# Patient Record
Sex: FEMALE | Race: WHITE | NOT HISPANIC OR LATINO | Employment: OTHER | URBAN - METROPOLITAN AREA
[De-identification: names, ages, dates, MRNs, and addresses within clinical notes are randomized per-mention and may not be internally consistent; named-entity substitution may affect disease eponyms.]

---

## 2023-09-08 PROBLEM — H25.10 NUCLEAR SENILE CATARACT: Status: ACTIVE | Noted: 2023-09-08

## 2023-09-08 PROBLEM — E03.9 ACQUIRED HYPOTHYROIDISM: Status: ACTIVE | Noted: 2023-09-08

## 2023-09-08 PROBLEM — M81.0 AGE RELATED OSTEOPOROSIS: Status: ACTIVE | Noted: 2023-09-08

## 2023-09-08 PROBLEM — H35.09: Status: ACTIVE | Noted: 2023-09-08

## 2023-09-08 PROBLEM — H35.3190 NONEXUDATIVE AGE-RELATED MACULAR DEGENERATION: Status: ACTIVE | Noted: 2023-09-08

## 2023-09-08 PROBLEM — H35.349 DEGENERATION OF MACULA DUE TO CYST OR HOLE, UNSPECIFIED LATERALITY: Status: ACTIVE | Noted: 2023-09-08

## 2023-09-08 PROBLEM — H34.829: Status: ACTIVE | Noted: 2023-09-08

## 2023-09-08 PROBLEM — H35.89 MACULAR ATROPHY, RETINAL: Status: ACTIVE | Noted: 2023-09-08

## 2023-09-08 PROBLEM — I10 PRIMARY HYPERTENSION: Status: ACTIVE | Noted: 2023-09-08

## 2023-09-08 PROBLEM — R63.0 LOSS OF APPETITE: Status: ACTIVE | Noted: 2023-09-08

## 2023-09-08 RX ORDER — LISINOPRIL AND HYDROCHLOROTHIAZIDE 10; 12.5 MG/1; MG/1
1 TABLET ORAL DAILY
COMMUNITY
End: 2023-10-31 | Stop reason: SDUPTHER

## 2023-09-08 RX ORDER — MULTIVITAMIN
1 TABLET ORAL
COMMUNITY

## 2023-09-08 RX ORDER — LEVOTHYROXINE SODIUM 25 UG/1
1 TABLET ORAL DAILY
COMMUNITY
Start: 2017-04-13 | End: 2024-01-22 | Stop reason: SDUPTHER

## 2023-09-08 RX ORDER — ASPIRIN 81 MG/1
1 TABLET ORAL DAILY
COMMUNITY

## 2023-10-31 DIAGNOSIS — I10 PRIMARY HYPERTENSION: ICD-10-CM

## 2023-10-31 RX ORDER — LISINOPRIL AND HYDROCHLOROTHIAZIDE 10; 12.5 MG/1; MG/1
1 TABLET ORAL DAILY
Qty: 90 TABLET | Refills: 3 | Status: SHIPPED | OUTPATIENT
Start: 2023-10-31

## 2023-11-08 ENCOUNTER — OFFICE VISIT (OUTPATIENT)
Dept: PRIMARY CARE | Facility: CLINIC | Age: 88
End: 2023-11-08
Payer: MEDICARE

## 2023-11-08 VITALS
HEIGHT: 60 IN | RESPIRATION RATE: 16 BRPM | BODY MASS INDEX: 19.2 KG/M2 | DIASTOLIC BLOOD PRESSURE: 78 MMHG | HEART RATE: 60 BPM | WEIGHT: 97.8 LBS | OXYGEN SATURATION: 97 % | SYSTOLIC BLOOD PRESSURE: 130 MMHG

## 2023-11-08 DIAGNOSIS — Z23 ENCOUNTER FOR IMMUNIZATION: ICD-10-CM

## 2023-11-08 DIAGNOSIS — E03.9 ACQUIRED HYPOTHYROIDISM: ICD-10-CM

## 2023-11-08 DIAGNOSIS — R73.9 HYPERGLYCEMIA: ICD-10-CM

## 2023-11-08 DIAGNOSIS — H35.341 MACULAR HOLE OF RIGHT EYE: ICD-10-CM

## 2023-11-08 DIAGNOSIS — M81.0 AGE-RELATED OSTEOPOROSIS WITHOUT CURRENT PATHOLOGICAL FRACTURE: ICD-10-CM

## 2023-11-08 DIAGNOSIS — Z00.00 ROUTINE GENERAL MEDICAL EXAMINATION AT A HEALTH CARE FACILITY: Primary | ICD-10-CM

## 2023-11-08 DIAGNOSIS — I10 PRIMARY HYPERTENSION: ICD-10-CM

## 2023-11-08 DIAGNOSIS — Z13.220 LIPID SCREENING: ICD-10-CM

## 2023-11-08 PROCEDURE — G0439 PPPS, SUBSEQ VISIT: HCPCS | Performed by: INTERNAL MEDICINE

## 2023-11-08 PROCEDURE — 1159F MED LIST DOCD IN RCRD: CPT | Performed by: INTERNAL MEDICINE

## 2023-11-08 PROCEDURE — 3078F DIAST BP <80 MM HG: CPT | Performed by: INTERNAL MEDICINE

## 2023-11-08 PROCEDURE — 3075F SYST BP GE 130 - 139MM HG: CPT | Performed by: INTERNAL MEDICINE

## 2023-11-08 PROCEDURE — 1036F TOBACCO NON-USER: CPT | Performed by: INTERNAL MEDICINE

## 2023-11-08 PROCEDURE — 1126F AMNT PAIN NOTED NONE PRSNT: CPT | Performed by: INTERNAL MEDICINE

## 2023-11-08 PROCEDURE — G0008 ADMIN INFLUENZA VIRUS VAC: HCPCS | Performed by: INTERNAL MEDICINE

## 2023-11-08 ASSESSMENT — ENCOUNTER SYMPTOMS
HEMATURIA: 0
SLEEP DISTURBANCE: 0
VOMITING: 0
DIARRHEA: 0
SINUS PAIN: 0
DEPRESSION: 0
APPETITE CHANGE: 1
DIZZINESS: 0
OCCASIONAL FEELINGS OF UNSTEADINESS: 0
SHORTNESS OF BREATH: 0
WEAKNESS: 0
FEVER: 0
ABDOMINAL PAIN: 0
DIFFICULTY URINATING: 0
FATIGUE: 0
NAUSEA: 0
CHILLS: 0
ARTHRALGIAS: 0
PALPITATIONS: 0
CONSTIPATION: 0
FREQUENCY: 0
COUGH: 0
SORE THROAT: 0
LOSS OF SENSATION IN FEET: 0
JOINT SWELLING: 0
HEADACHES: 0
RHINORRHEA: 0
NERVOUS/ANXIOUS: 0

## 2023-11-08 ASSESSMENT — PAIN SCALES - GENERAL: PAINLEVEL: 0-NO PAIN

## 2023-11-08 ASSESSMENT — PATIENT HEALTH QUESTIONNAIRE - PHQ9
SUM OF ALL RESPONSES TO PHQ9 QUESTIONS 1 AND 2: 0
1. LITTLE INTEREST OR PLEASURE IN DOING THINGS: NOT AT ALL
2. FEELING DOWN, DEPRESSED OR HOPELESS: NOT AT ALL

## 2023-11-08 NOTE — PROGRESS NOTES
Subjective   Patient ID: Di Yo is a 89 y.o. female who presents for routine medical exam. Overall she is feeling well. Denies any pain or concerns. She has not had blood work in 2 years and requests labs. Interested in flu shot. Sees ophthalmologist. Patient will think about receiving bone density. Did not receive Shingrix and is not interested currently. She lives with her sister and no longer drives. Takes B12 and calcium supplements daily. Her appetite is limited but she reports it increases with activity.         Review of Systems   Constitutional:  Positive for appetite change (limited). Negative for chills, fatigue and fever.   HENT:  Negative for ear pain, rhinorrhea, sinus pain and sore throat.    Eyes:  Negative for visual disturbance.   Respiratory:  Negative for cough and shortness of breath.    Cardiovascular:  Negative for chest pain and palpitations.   Gastrointestinal:  Negative for abdominal pain, constipation, diarrhea, nausea and vomiting.   Genitourinary:  Negative for difficulty urinating, frequency and hematuria.   Musculoskeletal:  Negative for arthralgias and joint swelling.   Skin:  Negative for rash.   Neurological:  Negative for dizziness, weakness and headaches.   Psychiatric/Behavioral:  Negative for sleep disturbance. The patient is not nervous/anxious.        Objective   /78   Pulse 60   Resp 16   Ht 1.524 m (5')   Wt (!) 44.4 kg (97 lb 12.8 oz)   SpO2 97%   BMI 19.10 kg/m²     Physical Exam  HENT:      Right Ear: Tympanic membrane normal.      Left Ear: Tympanic membrane normal.      Mouth/Throat:      Pharynx: Oropharynx is clear. No oropharyngeal exudate.   Eyes:      Conjunctiva/sclera: Conjunctivae normal.      Pupils: Pupils are equal, round, and reactive to light.   Neck:      Thyroid: No thyromegaly.      Vascular: No carotid bruit.   Cardiovascular:      Rate and Rhythm: Regular rhythm.      Heart sounds: Normal heart sounds.   Pulmonary:      Breath sounds:  Normal breath sounds.   Chest:   Breasts:     Right: Normal. No mass or tenderness.      Left: Normal. No mass or tenderness.   Abdominal:      Palpations: Abdomen is soft. There is no hepatomegaly.      Tenderness: There is no abdominal tenderness.   Musculoskeletal:      Right lower leg: No edema.      Left lower leg: No edema.   Lymphadenopathy:      Cervical: No cervical adenopathy.   Skin:     General: Skin is warm.      Comments: No suspicious moles   Neurological:      Mental Status: She is alert and oriented to person, place, and time.      Motor: Motor function is intact.      Coordination: Coordination is intact.      Gait: Gait is intact.   Psychiatric:         Mood and Affect: Mood and affect normal.         Behavior: Behavior normal. Behavior is cooperative.         Cognition and Memory: Cognition normal.         Assessment/Plan   Diagnoses and all orders for this visit:  Routine general medical examination at a health care facility  Encounter for immunization  Hyperglycemia  Primary hypertension  Acquired hypothyroidism  Lipid screening  Macular hole of right eye  Age-related osteoporosis without current pathological fracture      Scribe Attestation  By signing my name below, IHarriett, Scribe   attest that this documentation has been prepared under the direction and in the presence of Qing Duran MD.

## 2024-02-13 ENCOUNTER — APPOINTMENT (OUTPATIENT)
Dept: OPHTHALMOLOGY | Facility: CLINIC | Age: 89
End: 2024-02-13
Payer: MEDICARE

## 2024-02-13 ENCOUNTER — CLINICAL SUPPORT (OUTPATIENT)
Dept: OPHTHALMOLOGY | Facility: CLINIC | Age: 89
End: 2024-02-13
Payer: MEDICARE

## 2024-02-13 ENCOUNTER — OFFICE VISIT (OUTPATIENT)
Dept: OPHTHALMOLOGY | Facility: CLINIC | Age: 89
End: 2024-02-13
Payer: MEDICARE

## 2024-02-13 DIAGNOSIS — H35.09: ICD-10-CM

## 2024-02-13 DIAGNOSIS — H52.7 REFRACTION ERROR: ICD-10-CM

## 2024-02-13 DIAGNOSIS — H25.813 COMBINED FORMS OF AGE-RELATED CATARACT OF BOTH EYES: ICD-10-CM

## 2024-02-13 DIAGNOSIS — H34.8322 STABLE BRANCH RETINAL VEIN OCCLUSION OF LEFT EYE (CMS-HCC): ICD-10-CM

## 2024-02-13 DIAGNOSIS — H35.341 MACULAR CYST, HOLE, OR PSEUDOHOLE, RIGHT EYE: Primary | ICD-10-CM

## 2024-02-13 PROBLEM — H35.349 DEGENERATION OF MACULA DUE TO CYST OR HOLE, UNSPECIFIED LATERALITY: Status: RESOLVED | Noted: 2023-09-08 | Resolved: 2024-02-13

## 2024-02-13 PROCEDURE — 99214 OFFICE O/P EST MOD 30 MIN: CPT | Performed by: OPHTHALMOLOGY

## 2024-02-13 PROCEDURE — 92134 CPTRZ OPH DX IMG PST SGM RTA: CPT | Mod: BILATERAL PROCEDURE | Performed by: OPHTHALMOLOGY

## 2024-02-13 PROCEDURE — 92015 DETERMINE REFRACTIVE STATE: CPT | Performed by: OPHTHALMOLOGY

## 2024-02-13 ASSESSMENT — KERATOMETRY
OD_AXISANGLE2_DEGREES: 85
OD_K1POWER_DIOPTERS: 44.00
OD_K2POWER_DIOPTERS: 45.25
OS_K1POWER_DIOPTERS: 44.50
OS_AXISANGLE2_DEGREES: 80
OS_AXISANGLE_DEGREES: 170
OD_AXISANGLE_DEGREES: 175
OS_K2POWER_DIOPTERS: 45.00
METHOD_AUTO_MANUAL: AUTOMATED

## 2024-02-13 ASSESSMENT — REFRACTION_WEARINGRX
OD_ADD: +3.00
OD_CYLINDER: -1.25
OD_AXIS: 064
OD_SPHERE: +3.00
SPECS_TYPE: BIFOCAL
OS_AXIS: 055
OS_ADD: +3.00
OS_CYLINDER: -1.25
OS_SPHERE: +3.00

## 2024-02-13 ASSESSMENT — VISUAL ACUITY
OS_CC: 20/30
METHOD: SNELLEN - SINGLE
OD_CC: HM
CORRECTION_TYPE: GLASSES
OS_CC+: -1

## 2024-02-13 ASSESSMENT — ENCOUNTER SYMPTOMS
GASTROINTESTINAL NEGATIVE: 0
HEMATOLOGIC/LYMPHATIC NEGATIVE: 0
DEPRESSION: 0
ENDOCRINE NEGATIVE: 0
ALLERGIC/IMMUNOLOGIC NEGATIVE: 0
EYES NEGATIVE: 0
RESPIRATORY NEGATIVE: 0
CARDIOVASCULAR NEGATIVE: 0
CONSTITUTIONAL NEGATIVE: 0
PSYCHIATRIC NEGATIVE: 0
NEUROLOGICAL NEGATIVE: 0
LOSS OF SENSATION IN FEET: 0
MUSCULOSKELETAL NEGATIVE: 0
OCCASIONAL FEELINGS OF UNSTEADINESS: 0

## 2024-02-13 ASSESSMENT — PAIN SCALES - GENERAL: PAINLEVEL: 0-NO PAIN

## 2024-02-13 ASSESSMENT — PATIENT HEALTH QUESTIONNAIRE - PHQ9
2. FEELING DOWN, DEPRESSED OR HOPELESS: NOT AT ALL
1. LITTLE INTEREST OR PLEASURE IN DOING THINGS: NOT AT ALL
SUM OF ALL RESPONSES TO PHQ9 QUESTIONS 1 AND 2: 0

## 2024-02-13 ASSESSMENT — REFRACTION_MANIFEST
OS_SPHERE: +3.25
OD_SPHERE: +1.25
OS_CYLINDER: -2.00
METHOD_AUTOREFRACTION: 1
OD_AXIS: 090
OD_CYLINDER: -2.75
OS_AXIS: 075

## 2024-02-13 ASSESSMENT — SLIT LAMP EXAM - LIDS
COMMENTS: 1+ BLEPHARITIS, 3+ DERMATOCHALASIS - UPPER LID
COMMENTS: 1+ BLEPHARITIS, 3+ DERMATOCHALASIS - UPPER LID

## 2024-02-13 ASSESSMENT — TONOMETRY
IOP_METHOD: GOLDMANN APPLANATION
OD_IOP_MMHG: 16
OS_IOP_MMHG: 15

## 2024-02-13 ASSESSMENT — EXTERNAL EXAM - RIGHT EYE: OD_EXAM: 2+ BROW PTOSIS

## 2024-02-13 ASSESSMENT — EXTERNAL EXAM - LEFT EYE: OS_EXAM: 2+ BROW PTOSIS

## 2024-02-13 ASSESSMENT — CUP TO DISC RATIO
OS_RATIO: 0.35
OD_RATIO: 0.35

## 2024-02-13 NOTE — PROGRESS NOTES
Subjective   Patient ID: Di Yo is a 89 y.o. female.    Chief Complaint    Annual Exam       HPI    No visual acuity (VA) complaints.    Complete, cataract, and macular exam.  No new changes in health history or meds.  Vision is essentially unchanged but wants new specs.  No new problems or complaints.      Last edited by Aydin Morales MD on 2/13/2024 11:06 AM.        No current outpatient medications on file. (Ophthalmology pharm classes)       Current Outpatient Medications (Other)   Medication Sig Dispense Refill    aspirin 81 mg EC tablet Take 1 tablet (81 mg) by mouth once daily.      calcium carbonate-vitamin D3 (Calcium 600 + D,3,) 600 mg-10 mcg (400 unit) tablet Take 1 tablet by mouth 2 times a day with meals.      levothyroxine (Synthroid, Levoxyl) 25 mcg tablet Take 1 tablet (25 mcg) by mouth once daily. 90 tablet 3    lisinopriL-hydrochlorothiazide 10-12.5 mg tablet Take 1 tablet by mouth once daily. 90 tablet 3       Objective   Base Eye Exam       Visual Acuity (Snellen - Single)         Right Left Both    Dist cc HM 20/30 -1     Near cc   J1      Correction: Glasses              Tonometry ([employer], 9:53 AM)         Right Left    Pressure 16 15              Pupils         Dark Shape React APD    Right 4 Round 2 None    Left 4 Round 2 None              Extraocular Movement         Right Left     Full Full              Dilation       Both eyes: 1% Tropic 2.5% Phen @ 9:53 AM                  Additional Tests       Keratometry (Automated)         K1 Axis K2 Axis    Right 44.00 85 45.25 175    Left 44.50 80 45.00 170                  Slit Lamp and Fundus Exam       External Exam         Right Left    External 2+ Brow ptosis 2+ Brow ptosis              Slit Lamp Exam         Right Left    Lids/Lashes 1+ Blepharitis, 3+ Dermatochalasis - upper lid 1+ Blepharitis, 3+ Dermatochalasis - upper lid    Conjunctiva/Sclera normal bulbar and palepbral conjunctiva normal bulbar and palepbral  conjunctiva    Cornea normal epi/stroma/endo and tear film normal epi/stroma/endo and tear film    Anterior Chamber ant. chamber deep and quiet ant. chamber deep and quiet    Iris iris normal iris normal    Lens 3+ Nuclear sclerosis, 2+ Cortical cataract 2+ Nuclear sclerosis, 2+ Cortical cataract    Anterior Vitreous vitreous syneresis vitreous syneresis              Fundus Exam         Right Left    Disc normal optic nerve tortuous vessels    C/D Ratio 0.35 0.35    Macula Macular hole Microaneurysms    Vessels normal vessels Tortuous    Periphery normal retinal periphery normal retinal periphery                  Refraction       Wearing Rx         Sphere Cylinder Axis Add    Right +3.00 -1.25 064 +3.00    Left +3.00 -1.25 055 +3.00      Type: Bifocal              Manifest Refraction (Auto)         Sphere Cylinder Axis    Right +1.25 -2.75 090    Left +3.25 -2.00 075              Final Rx         Sphere Cylinder [place] Dist VA Add Near VA    Right +3.00 -1.50 060 HM +3.00 HN    Left +3.00 -1.50 060 20/25 +3.00 20/25      Type: Bifocal    Expiration Date: 2/13/2026    Pupillary Distance: 62                    Assessment/Plan   Problem List Items Addressed This Visit          Eye/Vision problems    Intraretinal microvascular abnormality of left eye    Combined forms of age-related cataract of both eyes    Macular cyst, hole, or pseudohole, right eye - Primary    Relevant Orders    OCT, Retina - OU - Both Eyes (Completed)    Stable branch retinal vein occlusion of left eye    Refraction error

## 2024-02-26 DIAGNOSIS — E03.9 ACQUIRED HYPOTHYROIDISM: ICD-10-CM

## 2024-02-27 RX ORDER — LEVOTHYROXINE SODIUM 25 UG/1
25 TABLET ORAL DAILY
Qty: 90 TABLET | Refills: 3 | Status: SHIPPED | OUTPATIENT
Start: 2024-02-27 | End: 2025-02-26

## 2024-11-05 DIAGNOSIS — I10 PRIMARY HYPERTENSION: ICD-10-CM

## 2024-11-05 RX ORDER — LISINOPRIL AND HYDROCHLOROTHIAZIDE 10; 12.5 MG/1; MG/1
1 TABLET ORAL DAILY
Qty: 90 TABLET | Refills: 3 | Status: SHIPPED | OUTPATIENT
Start: 2024-11-05

## 2024-11-08 ENCOUNTER — APPOINTMENT (OUTPATIENT)
Dept: PRIMARY CARE | Facility: CLINIC | Age: 89
End: 2024-11-08
Payer: MEDICARE

## 2024-12-04 ENCOUNTER — OFFICE VISIT (OUTPATIENT)
Dept: PRIMARY CARE | Facility: CLINIC | Age: 89
End: 2024-12-04
Payer: MEDICARE

## 2024-12-04 VITALS
WEIGHT: 94.6 LBS | RESPIRATION RATE: 16 BRPM | OXYGEN SATURATION: 97 % | SYSTOLIC BLOOD PRESSURE: 140 MMHG | DIASTOLIC BLOOD PRESSURE: 80 MMHG | HEART RATE: 88 BPM | HEIGHT: 60 IN | BODY MASS INDEX: 18.57 KG/M2

## 2024-12-04 DIAGNOSIS — M81.0 AGE-RELATED OSTEOPOROSIS WITHOUT CURRENT PATHOLOGICAL FRACTURE: ICD-10-CM

## 2024-12-04 DIAGNOSIS — F41.9 ANXIETY: ICD-10-CM

## 2024-12-04 DIAGNOSIS — M19.011 BILATERAL SHOULDER REGION ARTHRITIS: ICD-10-CM

## 2024-12-04 DIAGNOSIS — I10 PRIMARY HYPERTENSION: ICD-10-CM

## 2024-12-04 DIAGNOSIS — M19.012 BILATERAL SHOULDER REGION ARTHRITIS: ICD-10-CM

## 2024-12-04 DIAGNOSIS — E03.9 ACQUIRED HYPOTHYROIDISM: ICD-10-CM

## 2024-12-04 DIAGNOSIS — Z00.00 ROUTINE GENERAL MEDICAL EXAMINATION AT A HEALTH CARE FACILITY: Primary | ICD-10-CM

## 2024-12-04 DIAGNOSIS — E53.8 B12 DEFICIENCY: ICD-10-CM

## 2024-12-04 PROCEDURE — 1170F FXNL STATUS ASSESSED: CPT | Performed by: INTERNAL MEDICINE

## 2024-12-04 PROCEDURE — 1159F MED LIST DOCD IN RCRD: CPT | Performed by: INTERNAL MEDICINE

## 2024-12-04 PROCEDURE — 99397 PER PM REEVAL EST PAT 65+ YR: CPT | Performed by: INTERNAL MEDICINE

## 2024-12-04 PROCEDURE — G0439 PPPS, SUBSEQ VISIT: HCPCS | Performed by: INTERNAL MEDICINE

## 2024-12-04 PROCEDURE — 1157F ADVNC CARE PLAN IN RCRD: CPT | Performed by: INTERNAL MEDICINE

## 2024-12-04 PROCEDURE — 99215 OFFICE O/P EST HI 40 MIN: CPT | Mod: 25 | Performed by: INTERNAL MEDICINE

## 2024-12-04 PROCEDURE — 99397 PER PM REEVAL EST PAT 65+ YR: CPT | Mod: 25 | Performed by: INTERNAL MEDICINE

## 2024-12-04 PROCEDURE — 3079F DIAST BP 80-89 MM HG: CPT | Performed by: INTERNAL MEDICINE

## 2024-12-04 PROCEDURE — 1125F AMNT PAIN NOTED PAIN PRSNT: CPT | Performed by: INTERNAL MEDICINE

## 2024-12-04 PROCEDURE — 3077F SYST BP >= 140 MM HG: CPT | Performed by: INTERNAL MEDICINE

## 2024-12-04 RX ORDER — LEVOTHYROXINE SODIUM 25 UG/1
25 TABLET ORAL DAILY
Qty: 90 TABLET | Refills: 3 | Status: SHIPPED | OUTPATIENT
Start: 2024-12-04 | End: 2025-12-04

## 2024-12-04 RX ORDER — LANOLIN ALCOHOL/MO/W.PET/CERES
1000 CREAM (GRAM) TOPICAL DAILY
COMMUNITY

## 2024-12-04 RX ORDER — LISINOPRIL AND HYDROCHLOROTHIAZIDE 10; 12.5 MG/1; MG/1
1 TABLET ORAL DAILY
Qty: 90 TABLET | Refills: 3 | Status: SHIPPED | OUTPATIENT
Start: 2024-12-04

## 2024-12-04 ASSESSMENT — ENCOUNTER SYMPTOMS
NERVOUS/ANXIOUS: 0
SHORTNESS OF BREATH: 0
SORE THROAT: 0
HEADACHES: 0
WEAKNESS: 0
LOSS OF SENSATION IN FEET: 0
DYSURIA: 0
APPETITE CHANGE: 1
SLEEP DISTURBANCE: 1
DIZZINESS: 0
OCCASIONAL FEELINGS OF UNSTEADINESS: 0
JOINT SWELLING: 0
DIARRHEA: 0
CHILLS: 0
CONSTIPATION: 0
FREQUENCY: 0
RHINORRHEA: 0
COUGH: 0
FATIGUE: 0
ARTHRALGIAS: 1
FEVER: 0
DIFFICULTY URINATING: 0
DEPRESSION: 0
NAUSEA: 0
PALPITATIONS: 0
HEMATURIA: 0
SINUS PAIN: 0
VOMITING: 0
ABDOMINAL PAIN: 0

## 2024-12-04 ASSESSMENT — COGNITIVE AND FUNCTIONAL STATUS - GENERAL
TRAIL MAKING TEST: PATIENT COMPLETES TRAIL MAKING TEST PROPERLY.
VERBAL FLUENCY - ANIMAL NAMES (0 TO 25): 25

## 2024-12-04 ASSESSMENT — ACTIVITIES OF DAILY LIVING (ADL)
GROCERY SHOPPING: NEEDS ASSISTANCE
STIL DRIVING: NO
USING TRANSPORTATION: NEEDS ASSISTANCE
DOING HOUSEWORK: NEEDS ASSISTANCE
PILL BOX USED: NO
PREPARING MEALS: INDEPENDENT
MANAGING FINANCES: NEEDS ASSISTANCE
USING TELEPHONE: INDEPENDENT
NEEDS ASSISTANCE WITH FOOD: INDEPENDENT
TAKING MEDICATION: INDEPENDENT
DRESSING: INDEPENDENT
FEEDING: INDEPENDENT
TOILETING: INDEPENDENT
ADEQUATE_TO_COMPLETE_ADL: YES
BATHING: INDEPENDENT
EATING: INDEPENDENT
JUDGMENT_ADEQUATE_SAFELY_COMPLETE_DAILY_ACTIVITIES: YES

## 2024-12-04 ASSESSMENT — PATIENT HEALTH QUESTIONNAIRE - PHQ9
1. LITTLE INTEREST OR PLEASURE IN DOING THINGS: NOT AT ALL
2. FEELING DOWN, DEPRESSED OR HOPELESS: NOT AT ALL
SUM OF ALL RESPONSES TO PHQ9 QUESTIONS 1 AND 2: 0

## 2024-12-04 ASSESSMENT — PAIN SCALES - GENERAL: PAINLEVEL_OUTOF10: 2

## 2024-12-04 NOTE — PROGRESS NOTES
Subjective   Patient ID: Di oY is a 89 y.o. female who presents for Annual Exam.    Patient is doing well overall. Lately, has been feeling mild pains in her shoulders. Wants to use a topical gel to help alleviate the pains. Wouldn't prefer to take any pills for shoulders. Doesn't check blood pressures at home but is ordering a blood pressure cuff. Has been under some stress due to recent life stress factors which has caused sleep disturbance and appetite change. Reports having a bone density done 6 years ago which diagnosed her with brittle bones. Takes a vitamin D and calcium supplement. Needs to get routine blood work done.     Diagnostics Reviewed:  Labs Reviewed:         Review of Systems   Constitutional:  Positive for appetite change (lowered due to stress). Negative for chills, fatigue and fever.   HENT:  Negative for ear pain, rhinorrhea, sinus pain and sore throat.    Eyes:  Negative for visual disturbance.   Respiratory:  Negative for cough and shortness of breath.    Cardiovascular:  Negative for chest pain and palpitations.   Gastrointestinal:  Negative for abdominal pain, constipation, diarrhea, nausea and vomiting.   Genitourinary:  Negative for difficulty urinating, dysuria, frequency and hematuria.   Musculoskeletal:  Positive for arthralgias (mild shoulder pains). Negative for joint swelling.   Skin:  Negative for rash.   Neurological:  Negative for dizziness, weakness and headaches.   Psychiatric/Behavioral:  Positive for sleep disturbance (stress related). The patient is not nervous/anxious.      Objective   /80   Pulse 88   Resp 16   Ht 1.524 m (5')   Wt (!) 42.9 kg (94 lb 9.6 oz)   SpO2 97%   BMI 18.48 kg/m²     Physical Exam  HENT:      Right Ear: Tympanic membrane normal. There is no impacted cerumen.      Left Ear: Tympanic membrane normal. There is no impacted cerumen.      Mouth/Throat:      Pharynx: Oropharynx is clear. No oropharyngeal exudate.   Eyes:       Conjunctiva/sclera: Conjunctivae normal.      Pupils: Pupils are equal, round, and reactive to light.   Neck:      Thyroid: No thyromegaly.      Vascular: No carotid bruit.   Cardiovascular:      Rate and Rhythm: Regular rhythm.      Heart sounds: Normal heart sounds.   Pulmonary:      Breath sounds: Normal breath sounds.   Abdominal:      Palpations: Abdomen is soft. There is no hepatomegaly.      Tenderness: There is no abdominal tenderness.   Musculoskeletal:         General: Tenderness (With range of motion both shoulders) present.      Right lower leg: No edema.      Left lower leg: No edema.   Lymphadenopathy:      Cervical: No cervical adenopathy.   Skin:     General: Skin is warm.      Comments: No suspicious moles   Neurological:      Mental Status: She is alert and oriented to person, place, and time.      Gait: Gait is intact.   Psychiatric:         Mood and Affect: Mood and affect normal.         Behavior: Behavior normal. Behavior is cooperative.         Cognition and Memory: Cognition normal.       Assessment/Plan   Diagnoses and all orders for this visit:  Routine general medical examination at a health care facility  -     CBC and Auto Differential; Future  -     Comprehensive Metabolic Panel; Future  -     Vitamin B12; Future  -     Lipid Panel; Future  -     TSH with reflex to Free T4 if abnormal; Future  -     zoster vaccine-recombinant adjuvanted (Shingrix) 50 mcg/0.5 mL vaccine; Inject 0.5 mL into the muscle every 8 (eight) weeks.  Acquired hypothyroidism  -     levothyroxine (Synthroid, Levoxyl) 25 mcg tablet; Take 1 tablet (25 mcg) by mouth once daily.  -     CBC and Auto Differential; Future  -     Comprehensive Metabolic Panel; Future  -     Vitamin B12; Future  -     Lipid Panel; Future  -     TSH with reflex to Free T4 if abnormal; Future  Primary hypertension  -     lisinopriL-hydrochlorothiazide 10-12.5 mg tablet; Take 1 tablet by mouth once daily.  -     CBC and Auto Differential;  Future  -     Comprehensive Metabolic Panel; Future  -     Vitamin B12; Future  -     Lipid Panel; Future  -     TSH with reflex to Free T4 if abnormal; Future  Bilateral shoulder region arthritis  -     CBC and Auto Differential; Future  -     Comprehensive Metabolic Panel; Future  -     Vitamin B12; Future  -     Lipid Panel; Future  -     TSH with reflex to Free T4 if abnormal; Future  Anxiety  -     CBC and Auto Differential; Future  -     Comprehensive Metabolic Panel; Future  -     Vitamin B12; Future  -     Lipid Panel; Future  -     TSH with reflex to Free T4 if abnormal; Future  B12 deficiency  -     CBC and Auto Differential; Future  -     Comprehensive Metabolic Panel; Future  -     Vitamin B12; Future  -     Lipid Panel; Future  -     TSH with reflex to Free T4 if abnormal; Future  Age-related osteoporosis without current pathological fracture  -     XR DEXA bone density; Future    Scribe Attestation  By signing my name below, I, Kiley Trammell   attest that this documentation has been prepared under the direction and in the presence of Qing Duran MD.

## 2025-02-24 ENCOUNTER — APPOINTMENT (OUTPATIENT)
Dept: OPHTHALMOLOGY | Facility: CLINIC | Age: OVER 89
End: 2025-02-24
Payer: MEDICARE

## 2025-02-24 DIAGNOSIS — H35.09: ICD-10-CM

## 2025-02-24 DIAGNOSIS — H34.8322 STABLE BRANCH RETINAL VEIN OCCLUSION OF LEFT EYE (CMS-HCC): ICD-10-CM

## 2025-02-24 DIAGNOSIS — H35.3130 BILATERAL NONEXUDATIVE AGE-RELATED MACULAR DEGENERATION, UNSPECIFIED STAGE: ICD-10-CM

## 2025-02-24 DIAGNOSIS — H52.7 REFRACTION ERROR: ICD-10-CM

## 2025-02-24 DIAGNOSIS — H35.341 MACULAR CYST, HOLE, OR PSEUDOHOLE, RIGHT EYE: ICD-10-CM

## 2025-02-24 DIAGNOSIS — H25.813 COMBINED FORMS OF AGE-RELATED CATARACT OF BOTH EYES: ICD-10-CM

## 2025-02-24 DIAGNOSIS — H35.89 MACULAR ATROPHY, RETINAL: Primary | ICD-10-CM

## 2025-02-24 PROCEDURE — 99214 OFFICE O/P EST MOD 30 MIN: CPT | Performed by: OPHTHALMOLOGY

## 2025-02-24 PROCEDURE — 92015 DETERMINE REFRACTIVE STATE: CPT | Mod: MUE | Performed by: OPHTHALMOLOGY

## 2025-02-24 PROCEDURE — 92134 CPTRZ OPH DX IMG PST SGM RTA: CPT | Performed by: OPHTHALMOLOGY

## 2025-02-24 PROCEDURE — 92015 DETERMINE REFRACTIVE STATE: CPT | Performed by: OPHTHALMOLOGY

## 2025-02-24 ASSESSMENT — KERATOMETRY
OD_K1POWER_DIOPTERS: 44.50
OD_AXISANGLE_DEGREES: 175
OS_AXISANGLE_DEGREES: 165
OD_AXISANGLE2_DEGREES: 85
OS_K2POWER_DIOPTERS: 45.00
OD_K2POWER_DIOPTERS: 45.75
OS_K1POWER_DIOPTERS: 44.25
OS_AXISANGLE2_DEGREES: 75

## 2025-02-24 ASSESSMENT — REFRACTION_MANIFEST
OD_CYLINDER: -1.75
OD_AXIS: 095
OS_ADD: +3.00
OS_SPHERE: +2.75
METHOD_AUTOREFRACTION: 1
OS_CYLINDER: -1.50
OS_AXIS: 065
OS_AXIS: 060
OS_CYLINDER: -1.50
OD_SPHERE: BALANCE
OS_SPHERE: +2.75
OD_SPHERE: -0.25

## 2025-02-24 ASSESSMENT — VISUAL ACUITY
OS_CC: 20/60
CORRECTION_TYPE: GLASSES
METHOD: SNELLEN - LINEAR
OS_PH_CC: 20/40
OD_CC: HM
OS_PH_CC+: -2

## 2025-02-24 ASSESSMENT — CUP TO DISC RATIO
OD_RATIO: 0.35
OS_RATIO: 0.35

## 2025-02-24 ASSESSMENT — REFRACTION_WEARINGRX
OS_ADD: +2.75
OD_AXIS: 060
SPECS_TYPE: BIFOCAL
OS_SPHERE: +3.00
OD_CYLINDER: -1.25
OD_SPHERE: +3.00
OD_ADD: +2.75
OS_CYLINDER: -1.25
OS_AXIS: 050

## 2025-02-24 ASSESSMENT — ENCOUNTER SYMPTOMS
NEUROLOGICAL NEGATIVE: 0
ALLERGIC/IMMUNOLOGIC NEGATIVE: 0
CARDIOVASCULAR NEGATIVE: 0
GASTROINTESTINAL NEGATIVE: 0
ENDOCRINE NEGATIVE: 0
PSYCHIATRIC NEGATIVE: 0
EYES NEGATIVE: 0
HEMATOLOGIC/LYMPHATIC NEGATIVE: 0
MUSCULOSKELETAL NEGATIVE: 0
CONSTITUTIONAL NEGATIVE: 0
RESPIRATORY NEGATIVE: 0

## 2025-02-24 ASSESSMENT — PAIN SCALES - GENERAL: PAINLEVEL_OUTOF10: 0-NO PAIN

## 2025-02-24 ASSESSMENT — EXTERNAL EXAM - LEFT EYE: OS_EXAM: 2+ BROW PTOSIS

## 2025-02-24 ASSESSMENT — PATIENT HEALTH QUESTIONNAIRE - PHQ9
SUM OF ALL RESPONSES TO PHQ9 QUESTIONS 1 AND 2: 0
2. FEELING DOWN, DEPRESSED OR HOPELESS: NOT AT ALL
1. LITTLE INTEREST OR PLEASURE IN DOING THINGS: NOT AT ALL

## 2025-02-24 ASSESSMENT — TONOMETRY
OD_IOP_MMHG: 15
OS_IOP_MMHG: 15
IOP_METHOD: TONOPEN

## 2025-02-24 ASSESSMENT — EXTERNAL EXAM - RIGHT EYE: OD_EXAM: 2+ BROW PTOSIS

## 2025-02-24 NOTE — PROGRESS NOTES
Subjective   Patient ID: Di Yo is a 89 y.o. female.    Chief Complaint    Annual Exam       HPI    Complete exam.  No new changes in health history or meds.  Vision is unchanged.  No new problems or complaints.    Last edited by Aydin Morales MD on 2/24/2025 11:22 AM.        No current outpatient medications on file. (Ophthalmology pharm classes)       Current Outpatient Medications (Other)   Medication Sig Dispense Refill    aspirin 81 mg EC tablet Take 1 tablet (81 mg) by mouth once daily.      calcium carbonate-vitamin D3 (Calcium 600 + D,3,) 600 mg-10 mcg (400 unit) tablet Take 1 tablet by mouth 2 times daily (morning and late afternoon).      cyanocobalamin (Vitamin B-12) 1,000 mcg tablet Take 1 tablet (1,000 mcg) by mouth once daily.      levothyroxine (Synthroid, Levoxyl) 25 mcg tablet Take 1 tablet (25 mcg) by mouth once daily. 90 tablet 3    lisinopriL-hydrochlorothiazide 10-12.5 mg tablet Take 1 tablet by mouth once daily. 90 tablet 3    zoster vaccine-recombinant adjuvanted (Shingrix) 50 mcg/0.5 mL vaccine Inject 0.5 mL into the muscle every 8 (eight) weeks. 1 mL 0       Objective   Base Eye Exam       Visual Acuity (Snellen - Linear)         Right Left Both    Dist cc HM 20/60     Dist ph cc  20/40 -2     Near cc   J1      Correction: Glasses              Tonometry (Tonopen, 9:52 AM)         Right Left    Pressure 15 15              Pupils         Dark Shape React APD    Right 4 Round 2 None    Left 4 Round 2 None              Extraocular Movement         Right Left     Full Full              Dilation       Both eyes: 1% Tropic 2.5% Phen @ 9:51 AM                  Additional Tests       Keratometry         K1 Axis K2 Axis    Right 44.50 85 45.75 175    Left 44.25 75 45.00 165                  Slit Lamp and Fundus Exam       External Exam         Right Left    External 2+ Brow ptosis 2+ Brow ptosis              Slit Lamp Exam         Right Left    Lids/Lashes 1+ Blepharitis, 3+ Dermatochalasis -  upper lid 1+ Blepharitis, 3+ Dermatochalasis - upper lid    Conjunctiva/Sclera White and quiet White and quiet    Cornea Clear Clear    Anterior Chamber Deep and quiet Deep and quiet    Iris Round and reactive Round and reactive    Lens 3+ Nuclear sclerosis, 2+ Cortical cataract 2+ Nuclear sclerosis, 2+ Cortical cataract    Anterior Vitreous vitreous syneresis vitreous syneresis              Fundus Exam         Right Left    Disc normal optic nerve tortuous vessels    C/D Ratio 0.35 0.35    Macula Macular hole Microaneurysms    Vessels normal vessels Tortuous    Periphery normal retinal periphery normal retinal periphery                  Refraction       Wearing Rx         Sphere Cylinder Axis Add    Right +3.00 -1.25 060 +2.75    Left +3.00 -1.25 050 +2.75      Type: Bifocal              Manifest Refraction (Auto)         Sphere Cylinder [place] Dist VA Add Near VA    Right -0.25 -1.75 095       Left +2.75 -1.50 065                 Manifest Refraction #2 (Subjective)         Sphere Cylinder [place] Dist VA Add Near VA    Right Balance         Left +2.75 -1.50 060 20/30 +3.00 20/25      Pupillary Distance: 60              Final Rx         Sphere Cylinder [place] Dist VA Add Near VA    Right Balance         Left +2.75 -1.50 060 20/30 +3.00 20/25      Expiration Date: 2/24/2027    Pupillary Distance: 60                    Assessment/Plan   Problem List Items Addressed This Visit          Eye/Vision problems    Intraretinal microvascular abnormality of left eye    RESOLVED: Macular atrophy, retinal - Primary    Relevant Orders    OCT, Retina - OU - Both Eyes (Completed)    Nonexudative age-related macular degeneration    Combined forms of age-related cataract of both eyes    Macular cyst, hole, or pseudohole, right eye    Stable branch retinal vein occlusion of left eye (CMS-[facility])     F/u one year full with oct mac.  Spec rx given.           Refraction error